# Patient Record
Sex: MALE | Race: WHITE | URBAN - METROPOLITAN AREA
[De-identification: names, ages, dates, MRNs, and addresses within clinical notes are randomized per-mention and may not be internally consistent; named-entity substitution may affect disease eponyms.]

---

## 2021-12-15 ENCOUNTER — OFFICE VISIT (OUTPATIENT)
Dept: INTERNAL MEDICINE | Age: 21
End: 2021-12-15
Payer: COMMERCIAL

## 2021-12-15 VITALS
HEIGHT: 72 IN | WEIGHT: 171 LBS | OXYGEN SATURATION: 98 % | SYSTOLIC BLOOD PRESSURE: 110 MMHG | DIASTOLIC BLOOD PRESSURE: 70 MMHG | BODY MASS INDEX: 23.16 KG/M2 | HEART RATE: 64 BPM | TEMPERATURE: 98.4 F

## 2021-12-15 DIAGNOSIS — Z20.822 CLOSE EXPOSURE TO COVID-19 VIRUS: Primary | ICD-10-CM

## 2021-12-15 DIAGNOSIS — J01.00 ACUTE NON-RECURRENT MAXILLARY SINUSITIS: ICD-10-CM

## 2021-12-15 PROCEDURE — 99203 OFFICE O/P NEW LOW 30 MIN: CPT | Performed by: NURSE PRACTITIONER

## 2021-12-15 PROCEDURE — 87426 SARSCOV CORONAVIRUS AG IA: CPT | Performed by: NURSE PRACTITIONER

## 2021-12-15 RX ORDER — FLUTICASONE PROPIONATE 50 MCG
1 SPRAY, SUSPENSION (ML) NASAL DAILY
Qty: 1 EACH | Refills: 0 | Status: SHIPPED | OUTPATIENT
Start: 2021-12-15

## 2021-12-15 RX ORDER — CETIRIZINE HYDROCHLORIDE 10 MG/1
10 TABLET ORAL DAILY
Qty: 30 TABLET | Refills: 0 | Status: SHIPPED | OUTPATIENT
Start: 2021-12-15 | End: 2022-01-14

## 2021-12-15 ASSESSMENT — PATIENT HEALTH QUESTIONNAIRE - PHQ9
SUM OF ALL RESPONSES TO PHQ QUESTIONS 1-9: 0
SUM OF ALL RESPONSES TO PHQ QUESTIONS 1-9: 0
2. FEELING DOWN, DEPRESSED OR HOPELESS: 0
SUM OF ALL RESPONSES TO PHQ9 QUESTIONS 1 & 2: 0
SUM OF ALL RESPONSES TO PHQ QUESTIONS 1-9: 0
1. LITTLE INTEREST OR PLEASURE IN DOING THINGS: 0

## 2021-12-15 ASSESSMENT — ENCOUNTER SYMPTOMS
EYE REDNESS: 0
WHEEZING: 0
EYE ITCHING: 0
SINUS PRESSURE: 1
EYE DISCHARGE: 0
COUGH: 1
RHINORRHEA: 1
SORE THROAT: 1
SHORTNESS OF BREATH: 0

## 2021-12-15 NOTE — PROGRESS NOTES
raymundo Rebollar (:  2000) is a 24 y.o. male, New patient, here for evaluation of the following chief complaint(s):  Concern For COVID-19 (Cough; Sore throat;Runny nose, started yesterday, + exposure)      Vitals:    12/15/21 1643   BP: 110/70   Pulse: 64   Temp: 98.4 °F (36.9 °C)   SpO2: 98%       ASSESSMENT/PLAN:  1. Close exposure to COVID-19 virus  -     POCT COVID-19, Antigen - NEG  2. Acute non-recurrent maxillary sinusitis  -     fluticasone (FLONASE) 50 MCG/ACT nasal spray; 1 spray by Nasal route daily, Disp-1 each, R-0Normal  -     cetirizine (ZYRTEC) 10 MG tablet; Take 1 tablet by mouth daily, Disp-30 tablet, R-0Normal        Return if symptoms worsen or fail to improve. SUBJECTIVE/OBJECTIVE:    Other  This is a new problem. Episode onset: +covid exposure; cough, sore throat, runny nose started yesterday . The problem occurs constantly. The problem has been gradually worsening. Associated symptoms include chills, congestion, coughing and a sore throat. Pertinent negatives include no arthralgias, chest pain, myalgias or neck pain. Nothing aggravates the symptoms. He has tried nothing for the symptoms. Review of Systems   Constitutional: Positive for chills. HENT: Positive for congestion, ear pain, postnasal drip, rhinorrhea, sinus pressure and sore throat. Eyes: Negative for discharge, redness and itching. Respiratory: Positive for cough. Negative for shortness of breath and wheezing. Cardiovascular: Negative for chest pain and palpitations. Musculoskeletal: Negative for arthralgias, myalgias and neck pain. Physical Exam  Vitals reviewed. Constitutional:       General: He is awake. He is not in acute distress. Appearance: Normal appearance. HENT:      Right Ear: A middle ear effusion is present. Tympanic membrane is not erythematous. Left Ear: A middle ear effusion is present. Tympanic membrane is not erythematous.       Nose: Mucosal edema present. Right Turbinates: Swollen. Left Turbinates: Swollen. Right Sinus: Maxillary sinus tenderness and frontal sinus tenderness present. Left Sinus: Maxillary sinus tenderness and frontal sinus tenderness present. Mouth/Throat:      Lips: Pink. Mouth: Mucous membranes are moist.      Pharynx: No pharyngeal swelling, oropharyngeal exudate (post nasal drip) or posterior oropharyngeal erythema. Eyes:      General: Lids are normal.      Extraocular Movements: Extraocular movements intact. Conjunctiva/sclera: Conjunctivae normal.      Pupils: Pupils are equal, round, and reactive to light. Cardiovascular:      Rate and Rhythm: Normal rate and regular rhythm. Heart sounds: Normal heart sounds, S1 normal and S2 normal.   Pulmonary:      Effort: Pulmonary effort is normal. No respiratory distress. Breath sounds: Normal air entry. No decreased breath sounds, wheezing, rhonchi or rales. Musculoskeletal:      Cervical back: Normal range of motion. Skin:     General: Skin is warm and dry. Neurological:      Mental Status: He is alert and oriented to person, place, and time. Psychiatric:         Mood and Affect: Mood normal.         Behavior: Behavior is cooperative. An electronic signature was used to authenticate this note.     --TREVOR Henderson

## 2021-12-15 NOTE — PATIENT INSTRUCTIONS
Patient Education        9 Things To Do If You've Been Exposed to COVID-19  If you are fully vaccinated or have recently been sick with COVID-19, you may not need to quarantine. Stay home. If you've been exposed to the virus but don't have symptoms, you may need to stay in quarantine for up to 14 days. In some cases it may be shorter. Ask your doctor when it's safe to end your quarantine. Be sure to follow all instructions from your local health authorities. Don't go to school, work, or public areas. And don't use public transportation, ride-shares, or taxis unless you have no choice. Leave your home only if you need to get medical care. But call the doctor's office first so they know you're coming. Call your doctor. Call your doctor or other health professional to let them know that you've been exposed. They might want you to be tested, or they may have other instructions for you. Wear a mask when you are around other people. It can help stop the spread of the virus. Limit contact with people in your home. If possible, stay in a separate bedroom and use a separate bathroom. Avoid contact with pets and other animals. Cover your mouth and nose with a tissue when you cough or sneeze. Then throw it in the trash right away. Wash your hands often, especially after you cough or sneeze. Use soap and water, and scrub for at least 20 seconds. If soap and water aren't available, use an alcohol-based hand . Don't share personal household items. These include bedding, towels, cups and glasses, and eating utensils. Clean and disinfect your home every day. Use household  or disinfectant wipes or sprays. Current as of: March 26, 2021               Content Version: 13.0  © 2006-2021 [employer]. Care instructions adapted under license by [facility] ([facility]).  If you have questions about a medical condition or this instruction, always ask your healthcare

## 2021-12-17 ENCOUNTER — VIRTUAL VISIT (OUTPATIENT)
Dept: INTERNAL MEDICINE | Age: 21
End: 2021-12-17
Payer: COMMERCIAL

## 2021-12-17 DIAGNOSIS — R68.89 FLU-LIKE SYMPTOMS: ICD-10-CM

## 2021-12-17 DIAGNOSIS — Z20.822 ENCOUNTER FOR SCREENING LABORATORY TESTING FOR COVID-19 VIRUS: Primary | ICD-10-CM

## 2021-12-17 LAB
INFLUENZA A ANTIBODY: NORMAL
INFLUENZA B ANTIBODY: NORMAL
Lab: NORMAL
PERFORMING INSTRUMENT: NORMAL
QC PASS/FAIL: NORMAL
SARS-COV-2, POC: NORMAL

## 2021-12-17 PROCEDURE — 87804 INFLUENZA ASSAY W/OPTIC: CPT | Performed by: NURSE PRACTITIONER

## 2021-12-17 PROCEDURE — 99202 OFFICE O/P NEW SF 15 MIN: CPT | Performed by: NURSE PRACTITIONER

## 2021-12-17 PROCEDURE — 87426 SARSCOV CORONAVIRUS AG IA: CPT | Performed by: NURSE PRACTITIONER

## 2021-12-17 ASSESSMENT — ENCOUNTER SYMPTOMS
RHINORRHEA: 1
NAUSEA: 0
DIARRHEA: 0
WHEEZING: 0
SHORTNESS OF BREATH: 0
COUGH: 1
SORE THROAT: 0
ABDOMINAL PAIN: 0
VOMITING: 0
SINUS PAIN: 0
SINUS PRESSURE: 0

## 2021-12-17 NOTE — PROGRESS NOTES
Carri Jimenez (:  2000) is a 24 y.o. male, New patient, here for evaluation of the following chief complaint(s):  Concern For COVID-19 (positive exposure)      ASSESSMENT/PLAN:  1. Encounter for screening laboratory testing for COVID-19 virus  -     POCT COVID-19, Antigen  2. Flu-like symptoms  -     POCT Influenza A/B    Flu and covid negative. Reviewed usual course of a viral illness, preventing the spread to others, and supportive measures for symptom management. Reviewed symptoms requiring ER. Patient verbalizes understanding. I have reviewed and updated the electronic medical record. Return if symptoms worsen or fail to improve, for follow up with PCP. SUBJECTIVE/OBJECTIVE:    URI   This is a new problem. The current episode started yesterday. The problem has been unchanged. There has been no fever. Associated symptoms include congestion, coughing and rhinorrhea. Pertinent negatives include no abdominal pain, chest pain, diarrhea, ear pain, headaches, nausea, rash, sinus pain, sore throat, vomiting or wheezing. Associated symptoms comments: Denies loss of sense of taste or smell, household exposure. He has tried nothing for the symptoms. Review of Systems   Constitutional: Negative for chills, fatigue and fever. HENT: Positive for congestion, postnasal drip and rhinorrhea. Negative for ear pain, sinus pressure, sinus pain and sore throat. Respiratory: Positive for cough. Negative for shortness of breath and wheezing. Cardiovascular: Negative for chest pain. Gastrointestinal: Negative for abdominal pain, diarrhea, nausea and vomiting. Musculoskeletal: Negative for arthralgias and myalgias. Skin: Negative for rash. Neurological: Negative for headaches.        Physical Exam  PHYSICAL EXAMINATION:  [ INSTRUCTIONS:  \"[x]\" Indicates a positive item  \"[]\" Indicates a negative item  -- DELETE ALL ITEMS NOT EXAMINED]    [x] Alert  [x] Oriented to person/place/time      [x] No apparent distress      [x] Breathing appears normal      [x] Normal Mood      [] Poor short term memory  [] Poor long term memory    [] OTHER:      Due to this being a TeleHealth encounter, evaluation of the following organ systems is limited: Vitals/Constitutional/EENT/Resp/CV/GI//MS/Neuro/Skin/Heme-Lymph-Imm. Logan Gleason is a 24 y.o. male being evaluated by a Virtual Visit (video visit) encounter to address concerns as mentioned above. A caregiver was present when appropriate. Due to this being a TeleHealth encounter (During [place] [address] public [facility] emergency), evaluation of the following organ systems was limited: Vitals/Constitutional/EENT/Resp/CV/GI//MS/Neuro/Skin/Heme-Lymph-Imm. Pursuant to the emergency declaration under the [address] [place] [address], [address] [place] [address] authority and the [employer] Act, this Virtual Visit was conducted with patient's (and/or legal guardian's) consent, to reduce the patient's risk of exposure to COVID-19 and provide necessary medical care. The patient (and/or legal guardian) has also been advised to contact this office for worsening conditions or problems, and seek emergency medical treatment and/or call 911 if deemed necessary. Patient identification was verified at the start of the visit: Yes    Services were provided through a video synchronous discussion virtually to substitute for in-person clinic visit. Patient was located at home and provider was located in office or at home. An electronic signature was used to authenticate this note.     --TREVOR Parsons

## 2025-02-07 ENCOUNTER — OFFICE VISIT (OUTPATIENT)
Dept: URGENT CARE | Age: 25
End: 2025-02-07
Payer: COMMERCIAL

## 2025-02-07 VITALS
SYSTOLIC BLOOD PRESSURE: 167 MMHG | BODY MASS INDEX: 24.5 KG/M2 | DIASTOLIC BLOOD PRESSURE: 97 MMHG | RESPIRATION RATE: 20 BRPM | TEMPERATURE: 98.4 F | HEIGHT: 71 IN | OXYGEN SATURATION: 98 % | WEIGHT: 175 LBS | HEART RATE: 70 BPM

## 2025-02-07 DIAGNOSIS — J40 BRONCHITIS: Primary | ICD-10-CM

## 2025-02-07 DIAGNOSIS — R05.1 ACUTE COUGH: ICD-10-CM

## 2025-02-07 LAB
POC RAPID INFLUENZA A: NEGATIVE
POC RAPID INFLUENZA B: NEGATIVE

## 2025-02-07 RX ORDER — AZITHROMYCIN 250 MG/1
TABLET, FILM COATED ORAL
Qty: 6 TABLET | Refills: 0 | Status: SHIPPED | OUTPATIENT
Start: 2025-02-07 | End: 2025-02-12

## 2025-02-07 RX ORDER — BROMPHENIRAMINE MALEATE, PSEUDOEPHEDRINE HYDROCHLORIDE, AND DEXTROMETHORPHAN HYDROBROMIDE 2; 30; 10 MG/5ML; MG/5ML; MG/5ML
5 SYRUP ORAL 4 TIMES DAILY PRN
Qty: 120 ML | Refills: 0 | Status: SHIPPED | OUTPATIENT
Start: 2025-02-07 | End: 2025-02-17

## 2025-02-07 ASSESSMENT — PAIN SCALES - GENERAL: PAINLEVEL_OUTOF10: 0-NO PAIN

## 2025-02-08 ASSESSMENT — ENCOUNTER SYMPTOMS
GASTROINTESTINAL NEGATIVE: 1
CONSTITUTIONAL NEGATIVE: 1
SINUS PAIN: 1
MUSCULOSKELETAL NEGATIVE: 1
PALPITATIONS: 0
COUGH: 1
ALLERGIC/IMMUNOLOGIC NEGATIVE: 1
SINUS PRESSURE: 1
ENDOCRINE NEGATIVE: 1
PSYCHIATRIC NEGATIVE: 1
NEUROLOGICAL NEGATIVE: 1
HEMATOLOGIC/LYMPHATIC NEGATIVE: 1
EYES NEGATIVE: 1

## 2025-02-08 NOTE — PROGRESS NOTES
"Subjective   Patient ID: Lincoln Alonso is a 24 y.o. male. They present today with a chief complaint of Nasal Congestion and Cough (Chest congestion x 1 week ).    History of Present Illness    Cough  Pertinent negatives include no chest pain.       Pt presents to urgent care with c/o   nasal congestion, sinus pressure, productive cough x 1 week.  Pt denies CP, SOB, palpitations, fevers, abd pain, n/v/d, sick contacts, recent travel.        Past Medical History  Allergies as of 02/07/2025    (No Known Allergies)       (Not in a hospital admission)       Past Medical History:   Diagnosis Date    Contact with and (suspected) exposure to covid-19 12/12/2020    Suspected COVID-19 virus infection       No past surgical history on file.     reports that he has never smoked. He has never used smokeless tobacco.    Review of Systems  Review of Systems   Constitutional: Negative.    HENT:  Positive for congestion, sinus pressure and sinus pain.    Eyes: Negative.    Respiratory:  Positive for cough.    Cardiovascular:  Negative for chest pain and palpitations.   Gastrointestinal: Negative.    Endocrine: Negative.    Genitourinary: Negative.    Musculoskeletal: Negative.    Skin: Negative.    Allergic/Immunologic: Negative.    Neurological: Negative.    Hematological: Negative.    Psychiatric/Behavioral: Negative.     All other systems reviewed and are negative.                                 Objective    Vitals:    02/07/25 1038   BP: (!) 167/97   BP Location: Left arm   Patient Position: Sitting   Pulse: 70   Resp: 20   Temp: 36.9 °C (98.4 °F)   TempSrc: Oral   SpO2: 98%   Weight: 79.4 kg (175 lb)   Height: 1.803 m (5' 11\")     No LMP for male patient.    Physical Exam  Vitals and nursing note reviewed.   Constitutional:       General: He is not in acute distress.     Appearance: Normal appearance. He is not ill-appearing or toxic-appearing.   HENT:      Head: Atraumatic.      Right Ear: Tympanic membrane, ear canal " and external ear normal.      Left Ear: Tympanic membrane, ear canal and external ear normal.      Nose: Congestion present.      Mouth/Throat:      Mouth: Mucous membranes are moist.      Pharynx: Oropharynx is clear. No oropharyngeal exudate or posterior oropharyngeal erythema.   Eyes:      Extraocular Movements: Extraocular movements intact.      Conjunctiva/sclera: Conjunctivae normal.      Pupils: Pupils are equal, round, and reactive to light.   Cardiovascular:      Rate and Rhythm: Normal rate and regular rhythm.      Pulses: Normal pulses.      Heart sounds: Normal heart sounds.   Pulmonary:      Effort: Pulmonary effort is normal.      Breath sounds: Normal breath sounds.   Abdominal:      General: Abdomen is flat. Bowel sounds are normal.      Palpations: Abdomen is soft.      Tenderness: There is no abdominal tenderness.   Musculoskeletal:         General: Normal range of motion.      Cervical back: Normal range of motion and neck supple. No tenderness.   Skin:     General: Skin is warm and dry.      Capillary Refill: Capillary refill takes less than 2 seconds.   Neurological:      General: No focal deficit present.      Mental Status: He is alert and oriented to person, place, and time.   Psychiatric:         Mood and Affect: Mood normal.         Behavior: Behavior normal.         Thought Content: Thought content normal.         Procedures    Point of Care Test & Imaging Results from this visit  Results for orders placed or performed in visit on 02/07/25   POCT Influenza A/B manually resulted   Result Value Ref Range    POC Rapid Influenza A Negative Negative    POC Rapid Influenza B Negative Negative      No results found.    Diagnostic study results (if any) were reviewed by [facility].    Assessment/Plan   Allergies, medications, history, and pertinent labs/EKGs/Imaging reviewed by YUNG Allison.     Medical Decision Making  Urgent Care Course: Patient presents to the  with  rhinorrhea, cough, chest congestion. Patient is afebrile, hemodynamically stable.  Patient denies fever, n/v, cp, sob, ab pain, dysuria, diarrhea. Given longevity of symptoms will treat patient for bronchitis.  Patient given prescription for azithromycin, bromfed.  Patient given pneumonia warning signs and will f/u with pcp.   Prior external records reviewed: Outpatient records  Independent Hx provided by: Patient  Med Rx considered but ultimately not given: Steroids   Dx tests considered but ultimately not ordered: Influenza, Strep  Social determinant that may affects healthcare: Pharmacy closed  Pt's case/impression summarized and discussed with: Patient  Likely Dx given clinical picture: Bronchitis  Although not an exhaustive list of Differential Diagnosis (though considered), patient's HPI, PE, and other findings are not suggestive of: Pneumonia, pneumothorax, hemothorax, ACS, PE, bronchospasm, asthma exacerbation  Patient at time of discharge was clinically well-appearing and HDS for outpatient management. The patient and/or family was given the opportunity to ask questions prior to discharge, understood my verbal discussion of the plans for treatment, expected course, indications to return to  or seek further treatment in ED, and the need for timely follow up as directed.    Condition: Stable  Disposition: Discharged     Orders and Diagnoses  Diagnoses and all orders for this visit:  Bronchitis  -     azithromycin (Zithromax) 250 mg tablet; Take 2 tabs (500 mg) by mouth today, than 1 daily for 4 days.  -     brompheniramine-pseudoeph-DM 2-30-10 mg/5 mL syrup; Take 5 mL by mouth 4 times a day as needed for congestion or cough for up to 10 days.  Acute cough  -     POCT Influenza A/B manually resulted  -     brompheniramine-pseudoeph-DM 2-30-10 mg/5 mL syrup; Take 5 mL by mouth 4 times a day as needed for congestion or cough for up to 10 days.      Medical Admin Record      Patient disposition:  Home    Electronically signed by [facility]  8:09 AM